# Patient Record
Sex: FEMALE | Race: WHITE | Employment: UNEMPLOYED | URBAN - NONMETROPOLITAN AREA
[De-identification: names, ages, dates, MRNs, and addresses within clinical notes are randomized per-mention and may not be internally consistent; named-entity substitution may affect disease eponyms.]

---

## 2022-08-19 ENCOUNTER — APPOINTMENT (OUTPATIENT)
Dept: GENERAL RADIOLOGY | Age: 47
End: 2022-08-19
Payer: COMMERCIAL

## 2022-08-19 ENCOUNTER — HOSPITAL ENCOUNTER (EMERGENCY)
Age: 47
Discharge: HOME | End: 2022-08-19
Attending: EMERGENCY MEDICINE
Payer: COMMERCIAL

## 2022-08-19 VITALS
TEMPERATURE: 96.6 F | DIASTOLIC BLOOD PRESSURE: 87 MMHG | BODY MASS INDEX: 31.39 KG/M2 | RESPIRATION RATE: 16 BRPM | OXYGEN SATURATION: 96 % | WEIGHT: 200 LBS | SYSTOLIC BLOOD PRESSURE: 134 MMHG | HEIGHT: 67 IN | HEART RATE: 78 BPM

## 2022-08-19 DIAGNOSIS — R07.89 ATYPICAL CHEST PAIN: Primary | ICD-10-CM

## 2022-08-19 LAB
ALBUMIN SERPL-MCNC: 3.7 GM/DL (ref 3.4–5)
ALP BLD-CCNC: 59 U/L (ref 46–116)
ALT SERPL-CCNC: 41 U/L (ref 14–63)
ANION GAP: 10 MEQ/L (ref 8–16)
AST SERPL-CCNC: 23 U/L (ref 15–37)
BASOPHILS # BLD: 0.4 %
BASOPHILS ABSOLUTE: 0 THOU/MM3 (ref 0–0.1)
BILIRUB SERPL-MCNC: 0.5 MG/DL (ref 0.2–1)
BUN BLDV-MCNC: 24 MG/DL (ref 7–18)
CHLORIDE BLD-SCNC: 102 MEQ/L (ref 98–107)
CO2: 28 MEQ/L (ref 21–32)
CREAT SERPL-MCNC: 1 MG/DL (ref 0.6–1.3)
EKG ATRIAL RATE: 92 BPM
EKG P AXIS: 54 DEGREES
EKG P-R INTERVAL: 132 MS
EKG Q-T INTERVAL: 384 MS
EKG QRS DURATION: 92 MS
EKG QTC CALCULATION (BAZETT): 474 MS
EKG R AXIS: 30 DEGREES
EKG T AXIS: 63 DEGREES
EKG VENTRICULAR RATE: 92 BPM
EOSINOPHIL # BLD: 2 %
EOSINOPHILS ABSOLUTE: 0.2 THOU/MM3 (ref 0–0.4)
ERYTHROCYTE [DISTWIDTH] IN BLOOD BY AUTOMATED COUNT: 13.1 % (ref 11.5–14.5)
ERYTHROCYTE [DISTWIDTH] IN BLOOD BY AUTOMATED COUNT: 46.9 FL (ref 35–45)
GFR, ESTIMATED: 63 ML/MIN/1.73M2
GLUCOSE BLD-MCNC: 105 MG/DL (ref 74–106)
HCT VFR BLD CALC: 43.8 % (ref 37–47)
HEMOGLOBIN: 13.5 GM/DL (ref 12–16)
IMMATURE GRANS (ABS): 0.02 THOU/MM3 (ref 0–0.07)
IMMATURE GRANULOCYTES: 0.3 %
LYMPHOCYTES # BLD: 37.6 %
LYMPHOCYTES ABSOLUTE: 2.8 THOU/MM3 (ref 1–4.8)
MCH RBC QN AUTO: 30 PG (ref 26–33)
MCHC RBC AUTO-ENTMCNC: 30.8 GM/DL (ref 32.2–35.5)
MCV RBC AUTO: 97.3 FL (ref 81–99)
MONOCYTES # BLD: 8.6 %
MONOCYTES ABSOLUTE: 0.6 THOU/MM3 (ref 0.4–1.3)
NUCLEATED RED BLOOD CELLS: 0 /100 WBC
PLATELET # BLD: 261 THOU/MM3 (ref 130–400)
PMV BLD AUTO: 10.1 FL (ref 9.4–12.4)
POC CALCIUM: 8.9 MG/DL (ref 8.5–10.1)
POTASSIUM SERPL-SCNC: 3.3 MEQ/L (ref 3.5–5.1)
RBC # BLD: 4.5 MILL/MM3 (ref 4.2–5.4)
SEG NEUTROPHILS: 51.1 %
SEGMENTED NEUTROPHILS ABSOLUTE COUNT: 3.8 THOU/MM3 (ref 1.8–7.7)
SODIUM BLD-SCNC: 140 MEQ/L (ref 136–145)
TOTAL PROTEIN: 6.8 GM/DL (ref 6.4–8.2)
TROPONIN, HIGH SENSITIVITY: < 4 PG/ML (ref 0–51.3)
WBC # BLD: 7.5 THOU/MM3 (ref 4.8–10.8)

## 2022-08-19 PROCEDURE — 85025 COMPLETE CBC W/AUTO DIFF WBC: CPT

## 2022-08-19 PROCEDURE — 93010 ELECTROCARDIOGRAM REPORT: CPT | Performed by: INTERNAL MEDICINE

## 2022-08-19 PROCEDURE — 99285 EMERGENCY DEPT VISIT HI MDM: CPT

## 2022-08-19 PROCEDURE — 6370000000 HC RX 637 (ALT 250 FOR IP): Performed by: EMERGENCY MEDICINE

## 2022-08-19 PROCEDURE — 80053 COMPREHEN METABOLIC PANEL: CPT

## 2022-08-19 PROCEDURE — 71046 X-RAY EXAM CHEST 2 VIEWS: CPT

## 2022-08-19 PROCEDURE — 93005 ELECTROCARDIOGRAM TRACING: CPT | Performed by: EMERGENCY MEDICINE

## 2022-08-19 PROCEDURE — 84484 ASSAY OF TROPONIN QUANT: CPT

## 2022-08-19 RX ORDER — IBUPROFEN 800 MG/1
800 TABLET ORAL ONCE
Status: COMPLETED | OUTPATIENT
Start: 2022-08-19 | End: 2022-08-19

## 2022-08-19 RX ORDER — ASPIRIN 81 MG/1
324 TABLET, CHEWABLE ORAL ONCE
Status: COMPLETED | OUTPATIENT
Start: 2022-08-19 | End: 2022-08-19

## 2022-08-19 RX ADMIN — LIDOCAINE HYDROCHLORIDE: 20 SOLUTION ORAL; TOPICAL at 02:16

## 2022-08-19 RX ADMIN — IBUPROFEN 800 MG: 800 TABLET, FILM COATED ORAL at 03:21

## 2022-08-19 RX ADMIN — ASPIRIN 81 MG 324 MG: 81 TABLET ORAL at 02:16

## 2022-08-19 ASSESSMENT — PAIN SCALES - GENERAL: PAINLEVEL_OUTOF10: 0

## 2022-08-19 ASSESSMENT — ENCOUNTER SYMPTOMS
BLOOD IN STOOL: 0
DIARRHEA: 0
EYE DISCHARGE: 0
SORE THROAT: 0
WHEEZING: 0
ABDOMINAL PAIN: 0
VOMITING: 0
SHORTNESS OF BREATH: 0
EYE PAIN: 0

## 2022-08-19 ASSESSMENT — PAIN - FUNCTIONAL ASSESSMENT
PAIN_FUNCTIONAL_ASSESSMENT: NONE - DENIES PAIN

## 2022-08-19 NOTE — ED NOTES
Pt stable A&O x 3 given discharge and follow up info. Pt voiced no concerns and discharged from ER to self to home. Pt ambulated out of ER with no complications .        Zunilda Garsia RN  08/19/22 0866

## 2022-08-19 NOTE — ED PROVIDER NOTES
[address] [facility]  [address] [facility] [address]  Phone: [phone]    eMERGENCY dEPARTMENT eNCOUnter           [facility]       Chief Complaint   Patient presents with    Chest Pain     Pt c/o mid CP, intermittent sharp/stabbing, awoke pt from sleep, denies radiation/increase of pain with deep breath       Nurses Notes reviewed and I agree except as noted in the HPI. HISTORY OF PRESENT ILLNESS    April A Yamile is a 56 y.o. female who presented vehicle with above-mentioned complaint. She is accompanied by her . She states that she was awakened with chest pain. She describes her pain as mild substernal \"twinges\" which lasts about 30 to 40 seconds. Pain was not t relieved or exacerbated by anything. Pain did not radiate anywhere. She has history of acid reflux. She has no risk factors for CAD. There is no family history of CAD    REVIEW OF SYSTEMS     Review of Systems   Constitutional:  Negative for chills and fever. HENT:  Negative for sore throat. Eyes:  Negative for pain and discharge. Respiratory:  Negative for shortness of breath and wheezing. Cardiovascular:  Positive for chest pain. Negative for palpitations. Gastrointestinal:  Negative for abdominal pain, blood in stool, diarrhea and vomiting. Genitourinary:  Negative for dysuria and hematuria. Musculoskeletal:  Negative for neck pain and neck stiffness. Neurological:  Negative for seizures, syncope and headaches. Hematological:  Negative for adenopathy. PAST MEDICAL HISTORY    has no past medical history on file. SURGICAL HISTORY      has no past surgical history on file. CURRENT MEDICATIONS       There are no discharge medications for this patient. ALLERGIES     has No Known Allergies. FAMILY HISTORY     has no family status information on file. family history is not on file.     SOCIAL HISTORY          PHYSICAL EXAM     INITIAL VITALS:  height is 5' 7\" (1.702 m) and weight is 200 lb (90.7 kg). Her temporal temperature is 96.6 °F (35.9 °C) (abnormal). Her blood pressure is 134/87 and her pulse is 78. Her respiration is 16 and oxygen saturation is 96%. Physical Exam  Constitutional:       General: She is not in acute distress. Appearance: She is well-developed. She is not ill-appearing. HENT:      Head: Atraumatic. Eyes:      Conjunctiva/sclera: Conjunctivae normal.      Pupils: Pupils are equal, round, and reactive to light. Neck:      Thyroid: No thyromegaly. Vascular: No JVD. Trachea: No tracheal deviation. Cardiovascular:      Rate and Rhythm: Normal rate and regular rhythm. Heart sounds: No murmur heard. No friction rub. No gallop. Pulmonary:      Effort: Pulmonary effort is normal.      Breath sounds: Normal breath sounds. Abdominal:      General: Bowel sounds are normal.      Palpations: Abdomen is soft. Tenderness: There is no abdominal tenderness. Musculoskeletal:         General: No tenderness. Cervical back: Neck supple. Neurological:      Mental Status: She is alert.            DIFFERENTIAL DIAGNOSIS:       DIAGNOSTIC RESULTS     EKG:   EKG Interpretation    Interpreted by me    Rhythm: normal sinus   Rate: normal  Axis: normal  Ectopy: none  Conduction: normal  ST Segments: no acute change  T Waves: no acute change  Q Waves: none    Clinical Impression: no acute changes and normal EKG      LABS:   Labs Reviewed   COMPREHENSIVE METABOLIC PANEL - Abnormal; Notable for the following components:       Result Value    BUN 24 (*)     Potassium 3.3 (*)     All other components within normal limits   GLOMERULAR FILTRATION RATE, ESTIMATED - Abnormal; Notable for the following components:    GFR, Estimated 63 (*)     All other components within normal limits   TROPONIN   ANION GAP   CBC WITH AUTO DIFFERENTIAL       EMERGENCY DEPARTMENT COURSE:   Vitals:    Vitals:    08/19/22 0225 08/19/22 0240 08/19/22 0255 08/19/22 0310   BP: (!) 150/98 (!) 142/84 136/84 134/87   Pulse: 83 78 71 78   Resp:       Temp:       TempSrc:       SpO2:  100% 99% 96%   Weight:       Height:         Patient presented with chest pain which lasted for about 30 to 40 seconds at rest as mentioned above. Her pain does not appear to be cardiac from the description. She has no risk factors for CAD. She received aspirin, GI cocktail and ibuprofen. Reevaluation at 3:15 AM:  She was resting comfortably. She does not appear in any distress. I discussed diagnosis and treatment plan with her. She understood she needed a stress test was scheduled can be performed as an outpatient. She said that she will discuss this with her family doctor. FINAL IMPRESSION      1. Atypical chest pain          DISPOSITION/PLAN   Discharged home in good condition. PATIENT REFERRED TO:    Please call  to establish a family doctor  In 1 day      DISCHARGE MEDICATIONS:  There are no discharge medications for this patient.       (Please note that portions of this note were completed with a voice recognition program.  Efforts were made to edit the dictations but occasionally words are mis-transcribed.)    MD Tammy Andrea MD  08/19/22 1492

## 2022-08-19 NOTE — ED NOTES
Pt comes walking into ER room 1 from triage with chest pain that woke her up out of bed. The pain she states comes and goes and when it comes she says that it is a 4-5 outta 10. EKG done IV line with blood to lab.  vitals taken and stable.       Karol Ayoub RN  08/19/22 3012

## 2023-02-28 ENCOUNTER — HOSPITAL ENCOUNTER (EMERGENCY)
Age: 48
Discharge: HOME | End: 2023-02-28
Attending: EMERGENCY MEDICINE
Payer: COMMERCIAL

## 2023-02-28 VITALS
SYSTOLIC BLOOD PRESSURE: 133 MMHG | BODY MASS INDEX: 40.75 KG/M2 | HEIGHT: 63 IN | WEIGHT: 230 LBS | OXYGEN SATURATION: 99 % | RESPIRATION RATE: 16 BRPM | HEART RATE: 74 BPM | DIASTOLIC BLOOD PRESSURE: 80 MMHG | TEMPERATURE: 98 F

## 2023-02-28 DIAGNOSIS — R00.2 PALPITATIONS: ICD-10-CM

## 2023-02-28 DIAGNOSIS — R07.89 ATYPICAL CHEST PAIN: Primary | ICD-10-CM

## 2023-02-28 LAB
ALBUMIN SERPL BCP-MCNC: 3.7 GM/DL (ref 3.4–5)
ALP SERPL-CCNC: 62 U/L (ref 46–116)
ALT SERPL W P-5'-P-CCNC: 20 U/L (ref 14–63)
ANION GAP SERPL CALC-SCNC: 8 MEQ/L (ref 8–16)
AST SERPL W P-5'-P-CCNC: 17 U/L (ref 15–37)
BASOPHILS # BLD: 0.3 % (ref 0–3)
BASOPHILS ABSOLUTE: 0 THOU/MM3 (ref 0–0.1)
BILIRUB SERPL-MCNC: 0.3 MG/DL (ref 0.2–1)
BUN SERPL-MCNC: 23 MG/DL (ref 7–18)
CALCIUM SERPL-MCNC: 8.5 MG/DL (ref 8.5–10.1)
CHLORIDE SERPL-SCNC: 103 MEQ/L (ref 98–107)
CO2 SERPL-SCNC: 30 MEQ/L (ref 21–32)
CREAT SERPL-MCNC: 1 MG/DL (ref 0.6–1.3)
EKG ATRIAL RATE: 71 BPM
EKG P AXIS: 61 DEGREES
EKG P-R INTERVAL: 150 MS
EKG Q-T INTERVAL: 376 MS
EKG QRS DURATION: 90 MS
EKG QTC CALCULATION (BAZETT): 408 MS
EKG R AXIS: 24 DEGREES
EKG T AXIS: 52 DEGREES
EKG VENTRICULAR RATE: 71 BPM
EOSINOPHILS ABSOLUTE: 0.1 THOU/MM3 (ref 0–0.5)
EOSINOPHILS RELATIVE PERCENT: 1.1 % (ref 0–4)
GFR SERPL CREATININE-BSD FRML MDRD: > 60 ML/MIN/1.73M2
GLUCOSE SERPL-MCNC: 100 MG/DL (ref 74–106)
HCT VFR BLD CALC: 38.4 % (ref 37–47)
HEMOGLOBIN: 12.9 GM/DL (ref 12–16)
IMMATURE GRANS (ABS): 0 THOU/MM3 (ref 0–0.07)
IMMATURE GRANULOCYTES: 0 %
LYMPHOCYTES # BLD AUTO: 32.3 % (ref 15–47)
LYMPHOCYTES ABSOLUTE: 2.1 THOU/MM3 (ref 1–4.8)
MCH RBC QN AUTO: 29.8 PG (ref 26–32)
MCHC RBC AUTO-ENTMCNC: 33.6 GM/DL (ref 31–35)
MCV RBC AUTO: 88.7 FL (ref 81–99)
MONOCYTES: 0.5 THOU/MM3 (ref 0.3–1.3)
MONOCYTES: 8.2 % (ref 0–12)
PDW BLD-RTO: 12.2 % (ref 11.5–14.9)
PLATELET # BLD AUTO: 223 THOU/MM3 (ref 130–400)
PMV BLD AUTO: 9 FL (ref 9.4–12.4)
POTASSIUM SERPL-SCNC: 3.6 MEQ/L (ref 3.5–5.1)
PROT SERPL-MCNC: 6.8 GM/DL (ref 6.4–8.2)
RBC # BLD: 4.33 MILL/MM3 (ref 4.1–5.3)
SEG NEUTROPHILS: 58.1 % (ref 43–75)
SEGMENTED NEUTROPHILS ABSOLUTE COUNT: 3.7 THOU/MM3 (ref 1.8–7.7)
SODIUM SERPL-SCNC: 141 MEQ/L (ref 136–145)
TROPONIN, HIGH SENSITIVITY: < 4 PG/ML (ref 0–51.3)
WBC # BLD: 6.3 THOU/MM3 (ref 4.8–10.8)

## 2023-02-28 PROCEDURE — 93010 ELECTROCARDIOGRAM REPORT: CPT | Performed by: INTERNAL MEDICINE

## 2023-02-28 PROCEDURE — 84484 ASSAY OF TROPONIN QUANT: CPT

## 2023-02-28 PROCEDURE — 85025 COMPLETE CBC W/AUTO DIFF WBC: CPT

## 2023-02-28 PROCEDURE — 80053 COMPREHEN METABOLIC PANEL: CPT

## 2023-02-28 PROCEDURE — 99284 EMERGENCY DEPT VISIT MOD MDM: CPT

## 2023-02-28 PROCEDURE — 93005 ELECTROCARDIOGRAM TRACING: CPT | Performed by: EMERGENCY MEDICINE

## 2023-02-28 RX ORDER — BUSPIRONE HYDROCHLORIDE 7.5 MG/1
TABLET ORAL
COMMUNITY
Start: 2023-02-17

## 2023-02-28 ASSESSMENT — HEART SCORE: ECG: 0

## 2023-02-28 NOTE — ED NOTES
Patient presents today complaining of chest pressure and palpitations that comes and go since about Friday. She reports it is in center of her chest and she took a baby aspirin earlier today. She reports having lots of work stress in the last two weeks.        Shilpi Guillory RN  02/28/23 3601

## 2023-02-28 NOTE — Clinical Note
Kristyn Ovalle was seen and treated in our emergency department on 2/28/2023. She may return to work on 03/02/2023. If you have any questions or concerns, please don't hesitate to call.       Allan Pinon MD

## 2023-02-28 NOTE — DISCHARGE INSTRUCTIONS
Rest and monitor at home. No exertional exercise. Follow-up with cardiology tomorrow. Monitor for worsening symptoms or progression.

## 2023-02-28 NOTE — ED PROVIDER NOTES
[address]  [address] [place] [address]  Phone: [address]      Pt Name: Kristyn Buckley  MRN: [id number]  Birthdate 1975  Date of evaluation: 2/28/2023  Provider: Luisana Romero MD    CHIEF COMPLAINT       Chief Complaint   Patient presents with    Chest Pain         HISTORY OF PRESENT ILLNESS      Kristyn Ovalle is a 56 y.o. female who presents to the emergency department with above noted complaint. Patient's been doing okay. On Friday she ended up having some palpitations and somewhat of it, near syncopal episode but did not go out completely. She is then developed some chest discomfort rather vague count is short and fleeting. No other associated symptoms nausea vomiting diaphoresis        REVIEW OF SYSTEMS     Positive for palpitations. Positive for chest pain. No exertional pain nausea vomiting or diaphoresis  Review of Systems  All systems negative except as marked. PAST MEDICAL HISTORY     History reviewed. No pertinent past medical history. SURGICAL HISTORY       History reviewed. No pertinent surgical history. CURRENT MEDICATIONS       Previous Medications    BUSPIRONE (BUSPAR) 7.5 MG TABLET    take 1 tablet by mouth three times a day       ALLERGIES       Patient has no known allergies. FAMILY HISTORY       History reviewed. No pertinent family history. SOCIAL HISTORY       Social History     Tobacco Use    Smoking status: Former     Types: Cigarettes         PHYSICAL EXAM         Physical Exam    VITAL SIGNS: BP (!) 148/94   Pulse 78   Temp 98 °F (36.7 °C) (Oral)   Resp 14   Ht 5' 3\" (1.6 m)   Wt 230 lb (104.3 kg)   LMP 02/14/2023   SpO2 100%   BMI 40.74 kg/m²    Constitutional:  Alert not toxic or ill,   HENT:  normocephalic, Atraumatic,  Bilateral external ears normal, Oropharynx moist, No oral exudates, Nose normal.  Cervical Spine: Normal range of motion,  No stridor.  No tenderness, Supple,  Eyes:  No discharge or  Swelling,Conjunctiva normal, PERRL, EOMI,  Respiratory: No respiratory distress, Normal breath sounds,  No wheezing, No chest tenderness. Cardiovascular:  Normal heart rate, Normal rhythm, No murmurs, No rubs, No gallops. GI:  No reproducible pain, Bowel sounds normal, Soft, No masses, No pulsatile masses. No tenderness  Musculoskeletal:  Intact distal pulses, No edema, No tenderness, No cyanosis, No clubbing. Good range of motion in all major joints. No tenderness to palpation or major deformities noted. Back:No tenderness. Integument:  Warm, Dry, No erythema, No rash (on exposed areas)   Lymphatic:  No lymphadenopathy noted. Neurologic:  Alert & oriented x 3, Normal motor function, Normal sensory function, No focal deficits noted. Psychiatric:  Affect normal, Judgment normal, Mood normal.     DIAGNOSTIC RESULTS     EKG: Sinus rate and rhythm without ischemia or infarction. Rate of 71,     RADIOLOGY:         Interpretation per the Radiologist below, if available at the time of this note:    No orders to display         LABS:  Labs Reviewed   CBC WITH AUTO DIFFERENTIAL - Abnormal; Notable for the following components:       Result Value    MPV 9.0 (*)     All other components within normal limits   COMPREHENSIVE METABOLIC PANEL - Abnormal; Notable for the following components:    BUN 23 (*)     All other components within normal limits   TROPONIN   GLOMERULAR FILTRATION RATE, ESTIMATED   ANION GAP       All other labs were within normal range or not returned as of this dictation.         MIPS  BP (!) 148/94   Pulse 78   Temp 98 °F (36.7 °C) (Oral)   Resp 14   Ht 5' 3\" (1.6 m)   Wt 230 lb (104.3 kg)   LMP 02/14/2023   SpO2 100%   BMI 99.47 kg/m²   Not applicable        EMERGENCY DEPARTMENT COURSE and DIFFERENTIAL DIAGNOSIS/MDM:   Patient presents with some palpitations that have resolved although has some chest pain today sounds atypical although no associated nausea vomiting or diaphoresis. Checking routine labs    1)  Number and Complexity of Problems  Problem List This Visit: Palpitations and chest pain  Problem List Items Addressed This Visit    None  Visit Diagnoses       Atypical chest pain    -  Primary    Palpitations                Differential Diagnosis: Palpitations, cardiac arrhythmia, atrial fib, acute coronary syndromes    Diagnoses Considered but Do Not Suspect:   Aortic dissection, PE    Pertinent Comorbid Conditions:   Smoking history    2)  Data Reviewed  My EKG interpretation:   Sinus rate and rhythm without ischemia or infarction      Decision Rules/Scores utilized:   Heart score  Heart Score for chest pain patients  History: Slightly Suspicious  ECG: Normal  Patient Age: > 39 and < 65 years  *Risk factors for Atherosclerotic disease: Obesity, Cigarette smoking  Risk Factors: 1 or 2 risk factors  Troponin: < 1X normal limit  Heart Score Total: 2                Tests considered but not ordered and why:  Not applicable      External Documents Reviewed: Prior ER visits      Imaging that is independently reviewed with the radiologist report, not interpreted by me are:  Not applicable    Imaging that is independently reviewed and interpreted by me as:  Not applicable    See more data below for the lab and radiology tests and orders. 3)  Treatment and Disposition    Patient repeat assessment: Resting comfortably no other focal findings. Labs are reassuring. Heart score of 2. No active pain. Under more stress and usual at work. Has had a prior smoking history. Disposition discussion with patient/family:  Family at bedside    Case discussed with a consulting clinician:  No    Social determinants of health impacting treatment or disposition:  Not applicable    Shared Decision Making:  Not applicable    Code Status Discussion:  Not applicable      FINAL  REASSESSMENT   6:32 PM     Atypical chest pain with palpitations. Heart score of 2. Counseled patient and family regards to such. She was a prior smoker. She had palpitations on Friday. Today is Tuesday. Nothing is showing up here. She has had a prior atypical chest pain that was related to possible ribs. Would recommend cardiac evaluation given palpitations as well as chest pain episodes. She feels comfortable going home. We have rapid turnaround and cardiac evaluation tomorrow. CRITICAL CARE TIME   None    PROCEDURES:  None  Procedures     FINAL IMPRESSION      1. Atypical chest pain    2.  Palpitations          DISPOSITION/PLAN     DISPOSITION Decision To Discharge 02/28/2023 06:31:36 PM      PATIENT REFERRED TO:  Kelsie Fuentes, APRN - CNP  [address] [address]  [phone]    Call   Follow up from ER condition    DISCHARGE MEDICATIONS:  New Prescriptions    No medications on file       (Please note that portions of this note were completed with a voice recognition program.  Efforts were made to edit the dictations but occasionally words are mis-transcribed.)    Geena Rodas MD (electronically signed)  Attending Emergency Physician                      Geena Rodas MD  02/28/23 5343

## 2023-03-01 ENCOUNTER — OFFICE VISIT (OUTPATIENT)
Dept: CARDIOLOGY CLINIC | Age: 48
End: 2023-03-01
Payer: COMMERCIAL

## 2023-03-01 VITALS
HEIGHT: 63 IN | SYSTOLIC BLOOD PRESSURE: 152 MMHG | WEIGHT: 224 LBS | BODY MASS INDEX: 39.69 KG/M2 | DIASTOLIC BLOOD PRESSURE: 95 MMHG | HEART RATE: 68 BPM

## 2023-03-01 DIAGNOSIS — R07.89 CHEST PAIN, ATYPICAL: Primary | ICD-10-CM

## 2023-03-01 PROCEDURE — 99203 OFFICE O/P NEW LOW 30 MIN: CPT | Performed by: INTERNAL MEDICINE

## 2023-03-01 RX ORDER — ASPIRIN 81 MG/1
81 TABLET ORAL DAILY
Qty: 90 TABLET | Refills: 1 | COMMUNITY
Start: 2023-03-01

## 2023-03-01 NOTE — PROGRESS NOTES
Chief Complaint   Patient presents with    Establish Cardiologist    New Patient    Check-Up    Follow-Up from Hospital     Pt here for a hospital f/u- was in ER for chest pain    Chest discomfort  for the last 2 days, last few seconds 2/10, nonradiating, not exertion related  and no associated symptom  Took some as and advil  abd got better    Start recently twisting plaque    EKG done 2-28-23    Now chest pain better    Denied sob, dizziness or edema  No palpitation    Nonsmoker    FHX  None for CAD      History reviewed. No pertinent surgical history. No Known Allergies     History reviewed. No pertinent family history. Social History     Socioeconomic History    Marital status:      Spouse name: Not on file    Number of children: Not on file    Years of education: Not on file    Highest education level: Not on file   Occupational History    Not on file   Tobacco Use    Smoking status: Former     Types: Cigarettes    Smokeless tobacco: Not on file   Substance and Sexual Activity    Alcohol use: Not on file    Drug use: Not on file    Sexual activity: Not on file   Other Topics Concern    Not on file   Social History Narrative    Not on file     Social Determinants of Health     Financial Resource Strain: Not on file   Food Insecurity: Not on file   Transportation Needs: Not on file   Physical Activity: Not on file   Stress: Not on file   Social Connections: Not on file   Intimate Partner Violence: Not on file   Housing Stability: Not on file       Current Outpatient Medications   Medication Sig Dispense Refill    busPIRone (BUSPAR) 7.5 MG tablet take 1 tablet by mouth three times a day       No current facility-administered medications for this visit. Review of Systems -     General ROS: negative  Psychological ROS: negative  Hematological and Lymphatic ROS: No history of blood clots or bleeding disorder.    Respiratory ROS: no cough,  or wheezing, the rest see HPI  Cardiovascular ROS: See HPI  Gastrointestinal ROS: negative  Genito-Urinary ROS: no dysuria, trouble voiding, or hematuria  Musculoskeletal ROS: negative  Neurological ROS: no TIA or stroke symptoms  Dermatological ROS: negative      Blood pressure (!) 152/95, pulse 68, height 5' 3\" (1.6 m), weight 224 lb (101.6 kg), last menstrual period 02/14/2023. Physical Examination:    General appearance - alert, well appearing, and in no distress  HEENT- Pink conjunctiva  , Non-icteri sclera,PERRLA  Mental status - alert, oriented to person, place, and time  Neck - supple, no significant adenopathy, no JVD, or carotid bruits  Chest - clear to auscultation, no wheezes, rales or rhonchi, symmetric air entry  Heart - normal rate, regular rhythm, normal S1, S2, no murmurs, rubs, clicks or gallops  Abdomen - soft, nontender, nondistended, no masses or organomegaly  SHAWN- no CVA or flank tenderness, no suprapubic tenderness  Neurological - alert, oriented, normal speech, no focal findings or movement disorder noted  Musculoskeletal/limbs - no joint tenderness, deformity or swelling   - peripheral pulses normal, no pedal edema, no clubbing or cyanosis  Skin - normal coloration and turgor, no rashes, no suspicious skin lesions noted  Psych- appropriate mood and affect    Lab  No results for input(s): CKTOTAL, CKMB, CKMBINDEX, TROPONINI in the last 72 hours.   CBC:   Lab Results   Component Value Date/Time    WBC 6.3 02/28/2023 05:48 PM    RBC 4.33 02/28/2023 05:48 PM    HGB 12.9 02/28/2023 05:48 PM    HCT 38.4 02/28/2023 05:48 PM    MCV 88.7 02/28/2023 05:48 PM    MCH 29.8 02/28/2023 05:48 PM    MCHC 33.6 02/28/2023 05:48 PM    RDW 12.2 02/28/2023 05:48 PM     02/28/2023 05:48 PM    MPV 9.0 02/28/2023 05:48 PM     BMP:    Lab Results   Component Value Date/Time     02/28/2023 05:48 PM    K 3.6 02/28/2023 05:48 PM     02/28/2023 05:48 PM    CO2 30 02/28/2023 05:48 PM    BUN 23 02/28/2023 05:48 PM    LABALBU 3.7 02/28/2023 05:48 PM CREATININE 1.0 02/28/2023 05:48 PM    GLUCOSE 100 02/28/2023 05:48 PM     Hepatic Function Panel:    Lab Results   Component Value Date/Time    ALKPHOS 62 02/28/2023 05:48 PM    ALT 20 02/28/2023 05:48 PM    AST 17 02/28/2023 05:48 PM    PROT 6.8 02/28/2023 05:48 PM    BILITOT 0.3 02/28/2023 05:48 PM    LABALBU 3.7 02/28/2023 05:48 PM     Magnesium:  No results found for: MG  Warfarin PT/INR:  No components found for: PTPATWAR, PTINRWAR  HgBA1c:  No results found for: LABA1C  FLP:  No results found for: TRIG, HDL, LDLCALC, LDLDIRECT, LABVLDL  TSH:  No results found for: TSH    Ekg 3/1/23  Normal sinus rhythm with sinus arrhythmia Normal ECG    Hs troponin neg x2    Assessment     Diagnosis Orders   1. Chest pain, atypical            Plan     Continue the current treatment and with constant vigilance to changes in symptoms and also any potential side effects. Return for care or seek medical attention immediately if symptoms got worse and/or develop new symptoms.     Chest pain atypical  Not exertion related few sec  Stress echo  Echo  Asa 81 mg po qd    Ed record reviewed    RTC in 4 weeks        Lana Devine, [facility]

## 2023-03-23 ENCOUNTER — HOSPITAL ENCOUNTER (OUTPATIENT)
Dept: NON INVASIVE DIAGNOSTICS | Age: 48
Discharge: HOME | End: 2023-03-23
Payer: COMMERCIAL

## 2023-03-23 DIAGNOSIS — R07.89 CHEST PAIN, ATYPICAL: ICD-10-CM

## 2023-03-23 LAB
LV EF: 50 %
LV EF: 60 %
LVEF MODALITY: NORMAL
LVEF MODALITY: NORMAL

## 2023-03-23 PROCEDURE — 93350 STRESS TTE ONLY: CPT

## 2023-03-23 PROCEDURE — 93017 CV STRESS TEST TRACING ONLY: CPT | Performed by: INTERNAL MEDICINE

## 2023-03-23 PROCEDURE — 93306 TTE W/DOPPLER COMPLETE: CPT

## 2023-04-05 ENCOUNTER — TELEPHONE (OUTPATIENT)
Dept: CARDIOLOGY CLINIC | Age: 48
End: 2023-04-05

## 2023-08-27 ENCOUNTER — HOSPITAL ENCOUNTER (EMERGENCY)
Age: 48
Discharge: HOME | End: 2023-08-27
Attending: EMERGENCY MEDICINE
Payer: COMMERCIAL

## 2023-08-27 VITALS
HEART RATE: 79 BPM | WEIGHT: 245 LBS | OXYGEN SATURATION: 99 % | RESPIRATION RATE: 16 BRPM | DIASTOLIC BLOOD PRESSURE: 85 MMHG | TEMPERATURE: 97.5 F | BODY MASS INDEX: 43.41 KG/M2 | HEIGHT: 63 IN | SYSTOLIC BLOOD PRESSURE: 136 MMHG

## 2023-08-27 DIAGNOSIS — S93.402A SPRAIN OF LEFT ANKLE, UNSPECIFIED LIGAMENT, INITIAL ENCOUNTER: ICD-10-CM

## 2023-08-27 DIAGNOSIS — M25.572 ACUTE LEFT ANKLE PAIN: Primary | ICD-10-CM

## 2023-08-27 PROCEDURE — 99282 EMERGENCY DEPT VISIT SF MDM: CPT

## 2023-08-27 RX ORDER — MAGNESIUM 30 MG
30 TABLET ORAL DAILY
COMMUNITY

## 2023-08-27 RX ORDER — IBUPROFEN 800 MG/1
800 TABLET ORAL ONCE
Status: DISCONTINUED | OUTPATIENT
Start: 2023-08-27 | End: 2023-08-27 | Stop reason: HOSPADM

## 2023-08-27 RX ORDER — MULTIVIT-MIN/IRON/FOLIC ACID/K 18-600-40
CAPSULE ORAL
COMMUNITY

## 2023-08-27 ASSESSMENT — PAIN - FUNCTIONAL ASSESSMENT
PAIN_FUNCTIONAL_ASSESSMENT: 0-10
PAIN_FUNCTIONAL_ASSESSMENT: NONE - DENIES PAIN

## 2023-08-27 NOTE — DISCHARGE INSTRUCTIONS
Please take ibuprofen 800 every 8 hours as needed for pain  Please ice, rest and elevate left ankle  Please return if worse or new symptoms

## 2023-08-27 NOTE — ED NOTES
Pt pink, warm and dry, breathing with ease. Faint bruise noted of left lower leg as she punctured her leg about 2 weeks ago, no other injury. Rest, ice and elevation encouraged. AVS reviewed. Denies questions or concerns. Pt remains alert and oriented. Pt discharged in stable condition.        Simon Recinos RN  08/27/23 2366

## 2024-03-04 ENCOUNTER — APPOINTMENT (OUTPATIENT)
Dept: CT IMAGING | Age: 49
End: 2024-03-04
Payer: COMMERCIAL

## 2024-03-04 ENCOUNTER — HOSPITAL ENCOUNTER (EMERGENCY)
Age: 49
Discharge: HOME | End: 2024-03-04
Attending: EMERGENCY MEDICINE
Payer: COMMERCIAL

## 2024-03-04 VITALS
OXYGEN SATURATION: 98 % | HEART RATE: 82 BPM | RESPIRATION RATE: 16 BRPM | TEMPERATURE: 98.1 F | SYSTOLIC BLOOD PRESSURE: 141 MMHG | WEIGHT: 260 LBS | BODY MASS INDEX: 46.07 KG/M2 | HEIGHT: 63 IN | DIASTOLIC BLOOD PRESSURE: 75 MMHG

## 2024-03-04 DIAGNOSIS — R03.0 ELEVATED BLOOD PRESSURE READING: ICD-10-CM

## 2024-03-04 DIAGNOSIS — R20.2 FACIAL PARESTHESIA: Primary | ICD-10-CM

## 2024-03-04 LAB
ALBUMIN SERPL BCP-MCNC: 3.7 GM/DL (ref 3.4–5)
ALP SERPL-CCNC: 76 U/L (ref 46–116)
ALT SERPL W P-5'-P-CCNC: 23 U/L (ref 14–63)
ANION GAP SERPL CALC-SCNC: 7 MEQ/L (ref 8–16)
AST SERPL W P-5'-P-CCNC: 18 U/L (ref 15–37)
BASOPHILS # BLD: 0.3 % (ref 0–3)
BASOPHILS ABSOLUTE: 0 THOU/MM3 (ref 0–0.1)
BILIRUB SERPL-MCNC: 0.3 MG/DL (ref 0.2–1)
BUN SERPL-MCNC: 16 MG/DL (ref 7–18)
CALCIUM SERPL-MCNC: 9.1 MG/DL (ref 8.5–10.1)
CHLORIDE SERPL-SCNC: 104 MEQ/L (ref 98–107)
CO2 SERPL-SCNC: 30 MEQ/L (ref 21–32)
CREAT SERPL-MCNC: 0.9 MG/DL (ref 0.6–1.3)
EOSINOPHILS ABSOLUTE: 0.1 THOU/MM3 (ref 0–0.5)
EOSINOPHILS RELATIVE PERCENT: 1.6 % (ref 0–4)
GFR SERPL CREATININE-BSD FRML MDRD: > 60 ML/MIN/1.73M2
GLUCOSE SERPL-MCNC: 108 MG/DL (ref 74–106)
HCT VFR BLD CALC: 40.1 % (ref 37–47)
HEMOGLOBIN: 13.1 GM/DL (ref 12–16)
IMMATURE GRANS (ABS): 0 THOU/MM3 (ref 0–0.07)
IMMATURE GRANULOCYTES: 0 %
LYMPHOCYTES # BLD AUTO: 22.9 % (ref 15–47)
LYMPHOCYTES ABSOLUTE: 1.6 THOU/MM3 (ref 1–4.8)
MCH RBC QN AUTO: 28.9 PG (ref 26–32)
MCHC RBC AUTO-ENTMCNC: 32.7 GM/DL (ref 31–35)
MCV RBC AUTO: 88.5 FL (ref 81–99)
MONOCYTES: 0.5 THOU/MM3 (ref 0.3–1.3)
MONOCYTES: 7.5 % (ref 0–12)
NT PRO BNP: 62 PG/ML (ref 0–450)
PDW BLD-RTO: 12.2 % (ref 11.5–14.9)
PLATELET # BLD AUTO: 275 THOU/MM3 (ref 130–400)
PMV BLD AUTO: 8.8 FL (ref 9.4–12.4)
POTASSIUM SERPL-SCNC: 3.7 MEQ/L (ref 3.5–5.1)
PROT SERPL-MCNC: 7.6 GM/DL (ref 6.4–8.2)
RBC # BLD: 4.53 MILL/MM3 (ref 4.1–5.3)
SEG NEUTROPHILS: 67.3 % (ref 43–75)
SEGMENTED NEUTROPHILS ABSOLUTE COUNT: 4.6 THOU/MM3 (ref 1.8–7.7)
SODIUM SERPL-SCNC: 141 MEQ/L (ref 136–145)
TROPONIN, HIGH SENSITIVITY: < 4 PG/ML (ref 0–51.3)
WBC # BLD: 6.9 THOU/MM3 (ref 4.8–10.8)

## 2024-03-04 PROCEDURE — 84484 ASSAY OF TROPONIN QUANT: CPT

## 2024-03-04 PROCEDURE — 85025 COMPLETE CBC W/AUTO DIFF WBC: CPT

## 2024-03-04 PROCEDURE — 70450 CT HEAD/BRAIN W/O DYE: CPT

## 2024-03-04 PROCEDURE — 80053 COMPREHEN METABOLIC PANEL: CPT

## 2024-03-04 PROCEDURE — 93010 ELECTROCARDIOGRAM REPORT: CPT | Performed by: NUCLEAR MEDICINE

## 2024-03-04 PROCEDURE — 99284 EMERGENCY DEPT VISIT MOD MDM: CPT

## 2024-03-04 PROCEDURE — 83880 ASSAY OF NATRIURETIC PEPTIDE: CPT

## 2024-03-04 PROCEDURE — 93005 ELECTROCARDIOGRAM TRACING: CPT | Performed by: EMERGENCY MEDICINE

## 2024-03-04 RX ORDER — M-VIT,TX,IRON,MINS/CALC/FOLIC 27MG-0.4MG
1 TABLET ORAL DAILY
COMMUNITY

## 2024-03-04 ASSESSMENT — PAIN - FUNCTIONAL ASSESSMENT: PAIN_FUNCTIONAL_ASSESSMENT: NONE - DENIES PAIN

## 2024-03-04 NOTE — DISCHARGE INSTRUCTIONS
Of note your head CT shows normal CT appearance of the brain parenchyma. The cerebellar tonsils may lie below the foramen magnum. An elective brain MRI without contrast is recommended to assess for a Chiari I information.

## 2024-03-04 NOTE — ED PROVIDER NOTES
[facility]  EMERGENCY DEPARTMENT ENCOUNTER        PATIENT NAME: Kristyn Ovalle  MRN: [id number]  : 1975  CHRISTIANSON: 3/4/2024  PROVIDER: Gregor James MD    Patient was seen and evaluated at 12:06 PM EST. Nurses Notes are reviewed and I agree except as noted in the HPI.    HISTORY OF PRESENT ILLNESS   Kristyn Ovalle is a 48 y.o. female who presents to Emergency Department with Irregular Heart Beat (Started last week and has been intermittent with numbness to left side of face and left leg)     Left side facial numbness and high BP at work this morning. BP highest reading was 130/101. No headache. No blurred vision, no slurred speech. No chest pain, no SOB. No N/V/D. No abdominal pain. No leg swelling. Facial numbness is from left cheek only. No left arm or leg numbness or weakness. PMH of borderline high BP and obesity.     This HPI was provided by patient.     PAST MEDICAL HISTORY    has no past medical history on file.    SURGICAL HISTORY      has no past surgical history on file.    CURRENT MEDICATIONS       Previous Medications    ASPIRIN EC 81 MG EC TABLET    Take 1 tablet by mouth daily    BUSPIRONE (BUSPAR) 7.5 MG TABLET    take 1 tablet by mouth three times a day    CHOLECALCIFEROL (VITAMIN D) 50 MCG (2000 UT) CAPS CAPSULE    Take by mouth    CHOLECALCIFEROL (VITAMIN D3) 125 MCG (5000 UT) TABS    Take 1 tablet by mouth    MAGNESIUM 30 MG TABLET    Take 1 tablet by mouth daily    MULTIPLE VITAMINS-MINERALS (THERAPEUTIC MULTIVITAMIN-MINERALS) TABLET    Take 1 tablet by mouth daily       ALLERGIES     has No Known Allergies.    FAMILY HISTORY     She indicated that the status of her mother is unknown.   family history includes High Blood Pressure in her mother.    SOCIAL HISTORY      reports that she quit smoking about 16 years ago. Her smoking use included cigarettes. She started smoking about 31 years ago. She has a 7.5 pack-year smoking history. She has never used smokeless tobacco. She

## 2024-03-06 LAB
EKG ATRIAL RATE: 92 BPM
EKG P AXIS: 64 DEGREES
EKG P-R INTERVAL: 136 MS
EKG Q-T INTERVAL: 372 MS
EKG QRS DURATION: 88 MS
EKG QTC CALCULATION (BAZETT): 460 MS
EKG R AXIS: -6 DEGREES
EKG T AXIS: 50 DEGREES
EKG VENTRICULAR RATE: 92 BPM

## 2024-05-16 ENCOUNTER — HOSPITAL ENCOUNTER (EMERGENCY)
Age: 49
Discharge: HOME | End: 2024-05-16
Attending: FAMILY MEDICINE
Payer: COMMERCIAL

## 2024-05-16 VITALS
BODY MASS INDEX: 46.07 KG/M2 | HEIGHT: 63 IN | TEMPERATURE: 98.1 F | HEART RATE: 85 BPM | OXYGEN SATURATION: 100 % | RESPIRATION RATE: 13 BRPM | DIASTOLIC BLOOD PRESSURE: 89 MMHG | WEIGHT: 260 LBS | SYSTOLIC BLOOD PRESSURE: 129 MMHG

## 2024-05-16 DIAGNOSIS — R00.2 PALPITATIONS: Primary | ICD-10-CM

## 2024-05-16 LAB
ALBUMIN SERPL BCP-MCNC: 3.3 GM/DL (ref 3.4–5)
ALP SERPL-CCNC: 69 U/L (ref 46–116)
ALT SERPL W P-5'-P-CCNC: 41 U/L (ref 14–63)
ANION GAP SERPL CALC-SCNC: 8 MEQ/L (ref 8–16)
AST SERPL W P-5'-P-CCNC: 27 U/L (ref 15–37)
BASOPHILS # BLD: 0.5 % (ref 0–3)
BASOPHILS ABSOLUTE: 0 THOU/MM3 (ref 0–0.1)
BILIRUB SERPL-MCNC: 0.2 MG/DL (ref 0.2–1)
BUN SERPL-MCNC: 15 MG/DL (ref 7–18)
CALCIUM SERPL-MCNC: 8.5 MG/DL (ref 8.5–10.1)
CHLORIDE SERPL-SCNC: 102 MEQ/L (ref 98–107)
CO2 SERPL-SCNC: 30 MEQ/L (ref 21–32)
CREAT SERPL-MCNC: 1.1 MG/DL (ref 0.6–1.3)
EKG ATRIAL RATE: 101 BPM
EKG P AXIS: 61 DEGREES
EKG P-R INTERVAL: 112 MS
EKG Q-T INTERVAL: 354 MS
EKG QRS DURATION: 94 MS
EKG QTC CALCULATION (BAZETT): 459 MS
EKG R AXIS: 6 DEGREES
EKG T AXIS: 55 DEGREES
EKG VENTRICULAR RATE: 101 BPM
EOSINOPHILS ABSOLUTE: 0.2 THOU/MM3 (ref 0–0.5)
EOSINOPHILS RELATIVE PERCENT: 2.6 % (ref 0–4)
GFR SERPL CREATININE-BSD FRML MDRD: 62 ML/MIN/1.73M2
GLUCOSE SERPL-MCNC: 117 MG/DL (ref 74–106)
HCT VFR BLD CALC: 37.8 % (ref 37–47)
HEMOGLOBIN: 12.4 GM/DL (ref 12–16)
IMMATURE GRANS (ABS): 0 THOU/MM3 (ref 0–0.07)
IMMATURE GRANULOCYTES %: 0 %
LYMPHOCYTES # BLD AUTO: 34.6 % (ref 15–47)
LYMPHOCYTES ABSOLUTE: 2.3 THOU/MM3 (ref 1–4.8)
MCH RBC QN AUTO: 29.2 PG (ref 26–32)
MCHC RBC AUTO-ENTMCNC: 32.8 GM/DL (ref 31–35)
MCV RBC AUTO: 89.2 FL (ref 81–99)
MONOCYTES: 0.7 THOU/MM3 (ref 0.3–1.3)
MONOCYTES: 10.4 % (ref 0–12)
NEUTROPHILS ABSOLUTE: 3.4 THOU/MM3 (ref 1.8–7.7)
PDW BLD-RTO: 13 % (ref 11.5–14.9)
PLATELET # BLD AUTO: 277 THOU/MM3 (ref 130–400)
PMV BLD AUTO: 8.7 FL (ref 9.4–12.4)
POTASSIUM SERPL-SCNC: 3.5 MEQ/L (ref 3.5–5.1)
PROT SERPL-MCNC: 7.1 GM/DL (ref 6.4–8.2)
RBC # BLD: 4.24 MILL/MM3 (ref 4.1–5.3)
SEG NEUTROPHILS: 51.7 % (ref 43–75)
SODIUM SERPL-SCNC: 140 MEQ/L (ref 136–145)
TROPONIN, HIGH SENSITIVITY: < 4 PG/ML (ref 0–51.3)
TSH SERPL DL<=0.005 MIU/L-ACNC: 3.61 UIU/ML (ref 0.4–4.2)
WBC # BLD: 6.6 THOU/MM3 (ref 4.8–10.8)

## 2024-05-16 PROCEDURE — 80053 COMPREHEN METABOLIC PANEL: CPT

## 2024-05-16 PROCEDURE — 93010 ELECTROCARDIOGRAM REPORT: CPT | Performed by: INTERNAL MEDICINE

## 2024-05-16 PROCEDURE — 99284 EMERGENCY DEPT VISIT MOD MDM: CPT

## 2024-05-16 PROCEDURE — 84443 ASSAY THYROID STIM HORMONE: CPT

## 2024-05-16 PROCEDURE — 93005 ELECTROCARDIOGRAM TRACING: CPT | Performed by: FAMILY MEDICINE

## 2024-05-16 PROCEDURE — 85025 COMPLETE CBC W/AUTO DIFF WBC: CPT

## 2024-05-16 PROCEDURE — 84484 ASSAY OF TROPONIN QUANT: CPT

## 2024-05-16 ASSESSMENT — LIFESTYLE VARIABLES: HOW OFTEN DO YOU HAVE A DRINK CONTAINING ALCOHOL: NEVER

## 2024-05-16 ASSESSMENT — PAIN - FUNCTIONAL ASSESSMENT
PAIN_FUNCTIONAL_ASSESSMENT: NONE - DENIES PAIN

## 2024-05-16 ASSESSMENT — ENCOUNTER SYMPTOMS
SHORTNESS OF BREATH: 0
NAUSEA: 0
COUGH: 0

## 2024-05-16 NOTE — ED PROVIDER NOTES
[facility]  eMERGENCY dEPARTMENT eNCOUnter          CHIEF COMPLAINT       Chief Complaint   Patient presents with    Irregular Heart Beat       Nurses Notes reviewed and I agree except as noted in the HPI.      HISTORY OF PRESENT ILLNESS    April A Yamile is a 48 y.o. female who presents with heart palpitations. Patient notes she noted onset about 3 hours ago. Notes no chest pain. Patient states has had history of palpitations in past however these have been persistent. No history of thyroid issues. Denies any shortness of breath. Denies leg swelling. Denies fever,chills.  Does note history of anxiety.       REVIEW OF SYSTEMS     Review of Systems   Constitutional:  Negative for chills and fever.   Respiratory:  Negative for cough and shortness of breath.    Cardiovascular:  Positive for palpitations. Negative for chest pain and leg swelling.   Gastrointestinal:  Negative for nausea and vomiting.   Endocrine: Negative for cold intolerance and heat intolerance.   Musculoskeletal:  Negative for arthralgias and myalgias.   Skin:  Negative for pallor and rash.   Neurological:  Negative for dizziness, syncope, weakness and light-headedness.   Psychiatric/Behavioral:  Negative for agitation. The patient is nervous/anxious.    All other systems reviewed and are negative.        PAST MEDICAL HISTORY    has no past medical history on file.    SURGICAL HISTORY      has no past surgical history on file.    CURRENT MEDICATIONS       Previous Medications    ASPIRIN EC 81 MG EC TABLET    Take 1 tablet by mouth daily    BUSPIRONE (BUSPAR) 7.5 MG TABLET    take 1 tablet by mouth three times a day    CHOLECALCIFEROL (VITAMIN D) 50 MCG (2000 UT) CAPS CAPSULE    Take by mouth    CHOLECALCIFEROL (VITAMIN D3) 125 MCG (5000 UT) TABS    Take 1 tablet by mouth    MAGNESIUM 30 MG TABLET    Take 1 tablet by mouth daily    MULTIPLE VITAMINS-MINERALS (THERAPEUTIC MULTIVITAMIN-MINERALS) TABLET    Take 1 tablet by mouth daily

## 2024-05-16 NOTE — DISCHARGE INSTRUCTIONS
May require Holter monitoring in outpatient setting if symptoms persist. Avoid caffeine. Follow up with PCP.

## 2024-05-16 NOTE — ED TRIAGE NOTES
Patient reported, \"feeling heart palpitations, started when I was leaving work. We work next to an [place]f[employer], and sometimes I have a hard time breathing. I thought I was dehydrated so I tried to hydrate myself. I drank too much caffeine today. Last cup of coffee between .\" Observed patient resp easy steadily ambulate from the room to bed, EKG collected, Patient placed on monitor, IV initiated, Dr. Ruiz to bedside.

## 2024-05-17 NOTE — DISCHARGE INSTR - COC
Continuity of Care Form    Patient Name: Kristyn Cantrell   :  1975  MRN:  [id number]    Admit date:  2024  Discharge date:  ***    Code Status Order: No Order   Advance Directives:     Admitting Physician:  No admitting provider for patient encounter.  PCP: Gladys Meyers APRN - CNP    Discharging Nurse: ***  Discharging Hospital Unit/Room#: E2/E2  Discharging Unit Phone Number: ***    Emergency Contact:   Extended Emergency Contact Information  Primary Emergency Contact: LATESHA CANTRELL  Mobile Phone: [phone]  Relation: Spouse  Preferred language: English   needed? No    Past Surgical History:  No past surgical history on file.    Immunization History:     There is no immunization history on file for this patient.    Active Problems:  Patient Active Problem List   Diagnosis Code    Chest pain, atypical R07.89       Isolation/Infection:   Isolation            No Isolation          Patient Infection Status       None to display            Nurse Assessment:  Last Vital Signs: /89   Pulse 85   Temp 98.1 °F (36.7 °C) (Oral)   Resp 13   Ht 1.626 m (5' 4\")   Wt 117.9 kg (260 lb)   LMP 2024 Comment: irregular periods  SpO2 100%   BMI 44.63 kg/m²     Last documented pain score (0-10 scale):    Last Weight:   Wt Readings from Last 1 Encounters:   24 117.9 kg (260 lb)     Mental Status:  {IP PT MENTAL STATUS:}    IV Access:  { PONCHO IV ACCESS:244136088}    Nursing Mobility/ADLs:  Walking   {CHP DME ADLs:941767248}  Transfer  {CHP DME ADLs:647149834}  Bathing  {CHP DME ADLs:801187381}  Dressing  {CHP DME ADLs:884944303}  Toileting  {CHP DME ADLs:834870026}  Feeding  {CHP DME ADLs:871799646}  Med Admin  {CHP DME ADLs:107783378}  Med Delivery   { PONCHO MED Delivery:757254336}    Wound Care Documentation and Therapy:        Elimination:  Continence:   Bowel: {YES / NO:}  Bladder: {YES / NO:}  Urinary Catheter: {Urinary Catheter:216474784}